# Patient Record
Sex: MALE | Race: BLACK OR AFRICAN AMERICAN
[De-identification: names, ages, dates, MRNs, and addresses within clinical notes are randomized per-mention and may not be internally consistent; named-entity substitution may affect disease eponyms.]

---

## 2018-05-06 ENCOUNTER — HOSPITAL ENCOUNTER (EMERGENCY)
Dept: HOSPITAL 44 - ED | Age: 48
Discharge: HOME | End: 2018-05-06
Payer: COMMERCIAL

## 2018-05-06 VITALS — DIASTOLIC BLOOD PRESSURE: 83 MMHG | SYSTOLIC BLOOD PRESSURE: 178 MMHG

## 2018-05-06 DIAGNOSIS — L73.9: Primary | ICD-10-CM

## 2018-05-06 NOTE — ED PHYSICIAN DOCUMENTATION
Skin Rash





- HISTORIAN


Historian: patient





- HPI


Stated Complaint: rash on face


Chief Complaint: Skin Rash


Onset: days ago (2)


Timing: still present


Duration: worse


Location: facial


Quality: itchy, painful, burning


Identified Cause?: Yes (hair dye for beard )


Where: home


Context: Medication Exposure: none


Context: Food Exposure: none





- ROS


CONST: none


CVS/RESP: none


EYES/ENT: none


GI/: none


MS/SKIN/LYMPH: rash


NEURO/PSYCH: none





- PAST HX


Past History: none


Other History: none


Surgeries/Procedures: No


Immunizations: UTD


Allergies/Adverse Reactions: 


 Allergies











Allergy/AdvReac Type Severity Reaction Status Date / Time


 


Penicillins Allergy Unknown  Verified 05/06/18 17:48














Home Medications: 


 Ambulatory Orders











 Medication  Instructions  Recorded


 


Albuterol Sulfate [Proair Hfa] 8.5 gm IH Q6H PRN 05/06/18














- SOCIAL HX


Smoking History: non-smoker


Alcohol Use: none


Drug Use: none





- FAMILY HX


Family History: none





- VITAL SIGNS


Vital Signs: 


 Vital Signs











Temp Pulse Resp BP Pulse Ox


 


 98.0 F   78   16   178/83   97 


 


 05/06/18 17:42  05/06/18 17:42  05/06/18 17:42  05/06/18 17:42  05/06/18 17:42














- REVIEWED ASSESSMENTS


Nursing Assessment  Reviewed: Yes


Vitals Reviewed: Yes





Skin Rash Physical Exam





- EXAM


General Appearance: no acute distress, alert


Skin: warm,dry, other (red area on beard hair line. red raised clear discharge 

open areas /crusting areas )


Location: face


Character: symmetric, asymmetric


Symptoms: warmth, swelling


Extremities: non-tender, nml ROM


EENT: eyes nml inspection, lips nml


Neck: trachea midline, no swelling


Respiratory: no resp distress, chest non-tender, breath sounds normal


CVS: reg. rate & rhythm, heart sounds nml


Abdomen: non-tender, nml bowel sounds, no distention


Neuro/Psych: oriented x3, CN's nml as tested, motor nml, sensation nml, mood/

affect nml





Discharge


Clincal Impression: 


 Folliculitis





Referrals: 


Dick Root MD [Primary Care Provider] - 2 Days


Comments: 





1. Keep area clean and dry - no ointments


2. Medrol Dose pack - as directed


3. Bactrim DS take 1 by mouth BID x 10 days


4. Continue use of Benadryl as needed


5. Follow up with PCP in 2-4 days


6. Return to ER for increasing pain, redness or drainage or other concerns 


Condition: Stable


Disposition: 01 HOME, SELF-CARE


Decision to Admit: NO


Date of Decison to Admit: 05/06/18


Decision Time: 17:38

## 2018-08-12 ENCOUNTER — HOSPITAL ENCOUNTER (EMERGENCY)
Dept: HOSPITAL 44 - ED | Age: 48
LOS: 1 days | Discharge: HOME | End: 2018-08-13
Payer: COMMERCIAL

## 2018-08-12 DIAGNOSIS — L02.811: Primary | ICD-10-CM

## 2018-08-12 NOTE — ED PHYSICIAN DOCUMENTATION
General Adult





- HISTORIAN


Historian: patient





- HPI


Stated Complaint: scalp abscess


Chief Complaint: General Adult


Onset: days ago (1)


Timing: still present


Severity: moderate


Further Comments: yes (Pt is a 49 yo aa male with c/o an infection in his scalp.

 Pt shaves his head and wonders if he got an infection from shaving, or if he 

possibly had an insect bite.  Pt first noticed the swelling on his scalp this 

evening.  No n/v, fever or other sx.)





- ROS


CONST: no problems


EYES/ENT: none


CVS/RESP: none


GI/: none


MS/SKIN/LYMPH: other (scalp inflammation/swelling)





- PAST HX


Past History: none


Allergies/Adverse Reactions: 


                                    Allergies











Allergy/AdvReac Type Severity Reaction Status Date / Time


 


Penicillins Allergy Unknown  Verified 08/12/18 23:58














Home Medications: 


                                Ambulatory Orders











 Medication  Instructions  Recorded


 


Albuterol Sulfate [Proair Hfa] 8.5 gm IH Q6H PRN 05/06/18


 


Cephalexin [Keflex] 500 mg PO Q6H #40 capsule 08/12/18














- SOCIAL HX


Smoking History: cigarettes





- FAMILY HX


Family History: No





- VITAL SIGNS


Vital Signs: 





                                   Vital Signs











Temp Pulse Resp BP Pulse Ox


 


          178/83    


 


          05/06/18 17:42   














- REVIEWED ASSESSMENTS


Nursing Assessment  Reviewed: Yes


Vitals Reviewed: Yes





Progress





- Progress


Progress: 





Rx Keflex 500 mg.  Take one every 6 hours for 10 days.  1st dose in ER.





Return to ER or follow up with dermatologist if condition worsens or you have 

new symptoms or concerns.





ED Results Lab/Radiology





- Orders


Orders: 





                                    ED Orders











 Category Date Time Status


 


 Cephalexin [Keflex] Med  08/12/18 23:53 Once





 500 mg PO NOW ONE   














General Adult Physical Exam





- PHYSICAL EXAM


GENERAL APPEARANCE: no distress


EENT: pharynx normal


NECK: normal inspection, supple


RESPIRATORY: no resp distress, chest non-tender, breath sounds normal


CVS: reg rate & rhythm, heart sounds normal


BACK: normal inspection


SKIN: other (3 cm indurated scalp lesion, near crown of head (pt shaves his 

head).  No central darkening.  )


EXTREMITIES: non-tender, normal range of motion, no evidence of injury


NEURO: oriented X3, motor nml, sensation nml





Discharge


Clincal Impression: 


 Scalp abscess





Prescriptions: 


Cephalexin [Keflex] 500 mg PO Q6H #40 capsule


Referrals: 


Dick Root MD [Primary Care Provider] - 


Condition: Stable


Disposition: 01 HOME, SELF-CARE


Decision to Admit: NO


Decision Time: 00:15

## 2018-08-13 VITALS — SYSTOLIC BLOOD PRESSURE: 158 MMHG | DIASTOLIC BLOOD PRESSURE: 98 MMHG
